# Patient Record
Sex: MALE | Race: WHITE | NOT HISPANIC OR LATINO | Employment: UNEMPLOYED | URBAN - METROPOLITAN AREA
[De-identification: names, ages, dates, MRNs, and addresses within clinical notes are randomized per-mention and may not be internally consistent; named-entity substitution may affect disease eponyms.]

---
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
## 2023-06-14 ENCOUNTER — TELEPHONE (OUTPATIENT)
Dept: PEDIATRICS CLINIC | Facility: MEDICAL CENTER | Age: 18
End: 2023-06-14

## 2023-06-14 NOTE — TELEPHONE ENCOUNTER
Mom called-will be transferring him to a family doctor  Mailed list of [facility] providers  Will need to remove [facility] as pcp

## 2023-06-22 NOTE — TELEPHONE ENCOUNTER
06/22/23 5:29 PM     The office's request has been received, reviewed, and the patient chart updated  The PCP has successfully been removed with a patient attribution note  This message will now be completed      Thank you  Noe Nash